# Patient Record
Sex: FEMALE | Race: WHITE | NOT HISPANIC OR LATINO | Employment: FULL TIME | URBAN - METROPOLITAN AREA
[De-identification: names, ages, dates, MRNs, and addresses within clinical notes are randomized per-mention and may not be internally consistent; named-entity substitution may affect disease eponyms.]

---

## 2018-12-04 ENCOUNTER — OFFICE VISIT (OUTPATIENT)
Dept: URGENT CARE | Facility: CLINIC | Age: 28
End: 2018-12-04
Payer: COMMERCIAL

## 2018-12-04 VITALS
SYSTOLIC BLOOD PRESSURE: 137 MMHG | WEIGHT: 142.8 LBS | TEMPERATURE: 100 F | HEIGHT: 60 IN | BODY MASS INDEX: 28.03 KG/M2 | HEART RATE: 88 BPM | OXYGEN SATURATION: 96 % | DIASTOLIC BLOOD PRESSURE: 92 MMHG | RESPIRATION RATE: 16 BRPM

## 2018-12-04 DIAGNOSIS — J02.9 SORE THROAT: Primary | ICD-10-CM

## 2018-12-04 DIAGNOSIS — H65.192 OTHER ACUTE NONSUPPURATIVE OTITIS MEDIA OF LEFT EAR, RECURRENCE NOT SPECIFIED: ICD-10-CM

## 2018-12-04 LAB — S PYO AG THROAT QL: NEGATIVE

## 2018-12-04 PROCEDURE — 99203 OFFICE O/P NEW LOW 30 MIN: CPT | Performed by: NURSE PRACTITIONER

## 2018-12-04 PROCEDURE — 87430 STREP A AG IA: CPT | Performed by: NURSE PRACTITIONER

## 2018-12-04 RX ORDER — NORTRIPTYLINE HYDROCHLORIDE 10 MG/1
CAPSULE ORAL
COMMUNITY

## 2018-12-04 RX ORDER — VENLAFAXINE HYDROCHLORIDE 75 MG/1
75 CAPSULE, EXTENDED RELEASE ORAL DAILY
COMMUNITY

## 2018-12-04 RX ORDER — ATOMOXETINE 60 MG/1
60 CAPSULE ORAL DAILY
COMMUNITY

## 2018-12-04 RX ORDER — AMOXICILLIN AND CLAVULANATE POTASSIUM 875; 125 MG/1; MG/1
1 TABLET, FILM COATED ORAL EVERY 12 HOURS SCHEDULED
Qty: 14 TABLET | Refills: 0 | Status: SHIPPED | OUTPATIENT
Start: 2018-12-04 | End: 2018-12-11

## 2018-12-04 RX ORDER — MONTELUKAST SODIUM 10 MG/1
10 TABLET ORAL
COMMUNITY
End: 2019-11-01 | Stop reason: RX

## 2018-12-05 NOTE — PATIENT INSTRUCTIONS
Otitis Media   WHAT YOU NEED TO KNOW:   Otitis media is an ear infection  DISCHARGE INSTRUCTIONS:   Medicines:  · Ibuprofen or acetaminophen  helps decrease your pain and fever  They are available without a doctor's order  Ask your healthcare provider which medicine is right for you  Ask how much to take and how often to take it  These medicines can cause stomach bleeding if not taken correctly  Ibuprofen can cause kidney damage  Do not take ibuprofen if you have kidney disease, an ulcer, or allergies to aspirin  Acetaminophen can cause liver damage  Do not drink alcohol if you take acetaminophen  · Ear drops  help treat your ear pain  · Antibiotics  help treat a bacterial infection that caused your ear infection  · Take your medicine as directed  Contact your healthcare provider if you think your medicine is not helping or if you have side effects  Tell him or her if you are allergic to any medicine  Keep a list of the medicines, vitamins, and herbs you take  Include the amounts, and when and why you take them  Bring the list or the pill bottles to follow-up visits  Carry your medicine list with you in case of an emergency  Heat or ice:   · Heat  may be used to decrease your pain  Place a warm, moist washcloth on your ear  Apply for 15 to 20 minutes, 3 to 4 times a day    · Ice  helps decrease swelling and pain  Use an ice pack or put crushed ice in a plastic bag  Cover the ice pack with a towel and place it on your ear for 15 to 20 minutes, 3 to 4 times a day for 2 days  Prevent otitis media:   · Wash your hands often  Use soap and water  Wash your hands after you use the bathroom, change a child's diapers, or sneeze  Wash your hands before you prepare or eat food  · Stay away from people who are ill  Some germs are easily and quickly spread through contact  Return to work or school: You may return to work or school when your fever is gone     Follow up with your healthcare provider as directed:  Write down your questions so you remember to ask them during your visits  Contact your healthcare provider if:   · Your ear pain gets worse or does not go away, even after treatment  · The outside of your ear is red or swollen  · You have vomiting or diarrhea  · You have fluid coming from your ear  · You have questions or concerns about your condition or care  Return to the emergency department if:   · You have a seizure  · You have a fever and a stiff neck  © 2017 [facility] Information is for End User's use only and may not be sold, redistributed or otherwise used for commercial purposes  All illustrations and images included in CareNotes® are the copyrighted property of [employer]  or [employer]ul [employer]  The above information is an  only  It is not intended as medical advice for individual conditions or treatments  Talk to your doctor, nurse or pharmacist before following any medical regimen to see if it is safe and effective for you

## 2018-12-05 NOTE — PROGRESS NOTES
Assessment/Plan    Sore throat [J02 9]  1  Sore throat  POCT rapid strepA   2  Other acute nonsuppurative otitis media of left ear, recurrence not specified  amoxicillin-clavulanate (AUGMENTIN) 875-125 mg per tablet     - rapid strep test is negative  - Augmentin 875 milligrams b i d  X7 days for ear infection  - advised to follow up with PCP to ensure complete resolution        Subjective:  Presents to clinic with complaint sore throat, body aches and chills     Patient ID: Darrold Fleischer is a 33 y o  female  Reason For Visit / Chief Complaint  Chief Complaint   Patient presents with    Cold Like Symptoms     pt reports for 2 days she has been having chills, aches, sore throat and an ear ache off/on left ear  no fevers  advil prn         HPI  Symptoms has been on and off for a few days  Has been having ear pain, intermittent, for more than a week  Last experience the ear pain today, lasting about 30 seconds at a time  Said she had chills at home yesterday and today, took some Advil OTC, last dose about 6:30 a m  this morning  Did not check her temperature at home  Temperature at the clinic this evening is 100 degrees  She reports she took azithromycin about 3 weeks ago for sinus infection  Past Medical History:   Diagnosis Date    ADHD (attention deficit hyperactivity disorder)     Anxiety     Depression     Migraine        History reviewed  No pertinent surgical history  Family History   Problem Relation Age of Onset    No Known Problems Mother     No Known Problems Father        Review of Systems   Constitutional: Positive for chills  Negative for fever  HENT: Positive for congestion ( nose), ear pain, rhinorrhea, sinus pressure and sore throat  Negative for ear discharge and hearing loss  Respiratory: Negative for cough, chest tightness and wheezing  Cardiovascular: Negative for chest pain and palpitations  Gastrointestinal: Negative for diarrhea, nausea and vomiting  Neurological: Negative for dizziness  Objective:    /92 (BP Location: Left arm, Patient Position: Sitting)   Pulse 88   Temp 100 °F (37 8 °C) (Tympanic)   Resp 16   Ht 5' (1 524 m)   Wt 64 8 kg (142 lb 12 8 oz)   SpO2 96%   BMI 27 89 kg/m²     Physical Exam   HENT:   Mouth/Throat: No oropharyngeal exudate  Left ear canal with mild swelling and mild erythema  Tympanic membrane is bulging  Mild serous fluid behind the tympanic membrane  Mild to moderate erythema  Similar presentation to the right ear but with less severity  General throat area has mild erythema but tonsils are not swollen  Oral mucosa within normal limits  Cardiovascular: Normal rate, regular rhythm and normal heart sounds  Pulmonary/Chest: Effort normal and breath sounds normal  No respiratory distress  She has no wheezes  Lymphadenopathy:     She has no cervical adenopathy

## 2019-11-01 ENCOUNTER — OFFICE VISIT (OUTPATIENT)
Dept: URGENT CARE | Facility: CLINIC | Age: 29
End: 2019-11-01
Payer: COMMERCIAL

## 2019-11-01 VITALS
OXYGEN SATURATION: 98 % | DIASTOLIC BLOOD PRESSURE: 90 MMHG | TEMPERATURE: 98 F | WEIGHT: 149 LBS | RESPIRATION RATE: 18 BRPM | HEART RATE: 80 BPM | HEIGHT: 60 IN | SYSTOLIC BLOOD PRESSURE: 132 MMHG | BODY MASS INDEX: 29.25 KG/M2

## 2019-11-01 DIAGNOSIS — J01.40 ACUTE NON-RECURRENT PANSINUSITIS: Primary | ICD-10-CM

## 2019-11-01 DIAGNOSIS — R06.2 WHEEZING: ICD-10-CM

## 2019-11-01 PROCEDURE — G0382 LEV 3 HOSP TYPE B ED VISIT: HCPCS | Performed by: PHYSICIAN ASSISTANT

## 2019-11-01 PROCEDURE — S9083 URGENT CARE CENTER GLOBAL: HCPCS | Performed by: PHYSICIAN ASSISTANT

## 2019-11-01 RX ORDER — ELETRIPTAN HYDROBROMIDE 40 MG/1
40 TABLET, FILM COATED ORAL
COMMUNITY
Start: 2019-08-28 | End: 2020-08-27

## 2019-11-01 RX ORDER — VENLAFAXINE 75 MG/1
TABLET ORAL
COMMUNITY
Start: 2017-08-31

## 2019-11-01 RX ORDER — ALBUTEROL SULFATE 90 UG/1
2 AEROSOL, METERED RESPIRATORY (INHALATION) EVERY 6 HOURS PRN
Qty: 18 G | Refills: 0 | Status: SHIPPED | OUTPATIENT
Start: 2019-11-01

## 2019-11-01 RX ORDER — AMOXICILLIN AND CLAVULANATE POTASSIUM 875; 125 MG/1; MG/1
1 TABLET, FILM COATED ORAL EVERY 12 HOURS SCHEDULED
Qty: 14 TABLET | Refills: 0 | Status: SHIPPED | OUTPATIENT
Start: 2019-11-01 | End: 2019-11-08

## 2019-11-01 RX ORDER — PREDNISONE 20 MG/1
40 TABLET ORAL DAILY
Qty: 10 TABLET | Refills: 0 | Status: SHIPPED | OUTPATIENT
Start: 2019-11-01 | End: 2019-11-06

## 2019-11-01 RX ORDER — VENLAFAXINE HYDROCHLORIDE 37.5 MG/1
CAPSULE, EXTENDED RELEASE ORAL
COMMUNITY
Start: 2019-06-14

## 2019-11-01 NOTE — PATIENT INSTRUCTIONS

## 2019-11-12 NOTE — PROGRESS NOTES
Assessment/Plan    Acute non-recurrent pansinusitis [J01 40]  1  Acute non-recurrent pansinusitis  amoxicillin-clavulanate (AUGMENTIN) 875-125 mg per tablet   2  Wheezing  predniSONE 20 mg tablet    albuterol (VENTOLIN HFA) 90 mcg/act inhaler         Subjective:     Patient ID: Alex Caballero is a 30 y o  female  Reason For Visit / Chief Complaint  Chief Complaint   Patient presents with    Cough     x 1 week; worsens with exertion; has noticed some wheezing; has only taken advil for when throat becomes irritated from coughing;     Cold Like Symptoms     also about a week; mostly nasal sx; took sudafed at 0730          27-year-old female presents the clinic with nasal congestion, rhinorrhea, cough that started approximately a week ago  Patient states that she has also noticed her cough is worse with exertion and has had wheezing  Patient has taken Sudafed and Advil for symptom relief  Past Medical History:   Diagnosis Date    ADHD (attention deficit hyperactivity disorder)     Anxiety     Depression     Migraine        History reviewed  No pertinent surgical history  Family History   Problem Relation Age of Onset    No Known Problems Mother     No Known Problems Father        Review of Systems   Constitutional: Negative for chills and fever  HENT: Positive for congestion, rhinorrhea, sinus pressure and sore throat  Negative for sinus pain  Respiratory: Positive for cough and wheezing  Gastrointestinal: Negative for abdominal pain, diarrhea, nausea and vomiting  Musculoskeletal: Negative for myalgias  Neurological: Positive for headaches  Negative for dizziness and light-headedness         Objective:    /90 (BP Location: Left arm, Patient Position: Sitting) Comment: took sudafed this am  Pulse 80   Temp 98 °F (36 7 °C)   Resp 18   Ht 5' (1 524 m)   Wt 67 6 kg (149 lb)   SpO2 98%   BMI 29 10 kg/m²     Physical Exam   Constitutional: She is oriented to person, place, and time  She appears well-developed and well-nourished  No distress  HENT:   Head: Normocephalic and atraumatic  Right Ear: Tympanic membrane normal    Left Ear: Tympanic membrane normal    Nose: Mucosal edema and rhinorrhea present  Right sinus exhibits maxillary sinus tenderness and frontal sinus tenderness  Left sinus exhibits maxillary sinus tenderness and frontal sinus tenderness  Mouth/Throat: Uvula is midline and mucous membranes are normal  Posterior oropharyngeal erythema (PND) present  Cardiovascular: Normal rate and regular rhythm  Pulmonary/Chest: Effort normal  She has wheezes  Musculoskeletal: Normal range of motion  Neurological: She is alert and oriented to person, place, and time  Skin: Skin is warm and dry  No rash noted  She is not diaphoretic  Nursing note and vitals reviewed

## 2020-02-09 RX ORDER — ATOMOXETINE 60 MG/1
CAPSULE ORAL
Qty: 90 CAPSULE | Refills: 1 | OUTPATIENT
Start: 2020-02-09

## 2020-02-11 RX ORDER — ATOMOXETINE 60 MG/1
CAPSULE ORAL
Qty: 90 CAPSULE | Refills: 1 | OUTPATIENT
Start: 2020-02-11

## 2021-02-08 ENCOUNTER — OFFICE VISIT (OUTPATIENT)
Dept: URGENT CARE | Facility: CLINIC | Age: 31
End: 2021-02-08
Payer: COMMERCIAL

## 2021-02-08 VITALS
HEIGHT: 60 IN | RESPIRATION RATE: 18 BRPM | OXYGEN SATURATION: 97 % | TEMPERATURE: 99 F | HEART RATE: 108 BPM | BODY MASS INDEX: 26.5 KG/M2 | WEIGHT: 135 LBS

## 2021-02-08 DIAGNOSIS — Z11.59 SCREENING FOR VIRAL DISEASE: Primary | ICD-10-CM

## 2021-02-08 PROCEDURE — G0382 LEV 3 HOSP TYPE B ED VISIT: HCPCS | Performed by: PHYSICIAN ASSISTANT

## 2021-02-08 PROCEDURE — U0003 INFECTIOUS AGENT DETECTION BY NUCLEIC ACID (DNA OR RNA); SEVERE ACUTE RESPIRATORY SYNDROME CORONAVIRUS 2 (SARS-COV-2) (CORONAVIRUS DISEASE [COVID-19]), AMPLIFIED PROBE TECHNIQUE, MAKING USE OF HIGH THROUGHPUT TECHNOLOGIES AS DESCRIBED BY CMS-2020-01-R: HCPCS | Performed by: PHYSICIAN ASSISTANT

## 2021-02-08 PROCEDURE — S9083 URGENT CARE CENTER GLOBAL: HCPCS | Performed by: PHYSICIAN ASSISTANT

## 2021-02-08 PROCEDURE — U0005 INFEC AGEN DETEC AMPLI PROBE: HCPCS | Performed by: PHYSICIAN ASSISTANT

## 2021-02-08 NOTE — PROGRESS NOTES
[facility]        NAME: Mai Alfonso is a 35 y o  female  : 1990    MRN: [id number]  DATE: 2021  TIME: 11:17 AM    Assessment and Plan   Screening for viral disease [Z11 59]  1  Screening for viral disease  Novel Coronavirus (Covid-19),PCR [facility] - Office Collection         Patient Instructions     Swabbed for COVID-19, discussed self quarantine until contacted with results  Monitor for worsening symptoms  C/w OTC symptom relief including tylenol, fluids, rest  Recommend supplementing with vitamins D & C as well as a multivitamin    Follow up with PCP in 3-5 days  Proceed to  ER if symptoms worsen  Chief Complaint     Chief Complaint   Patient presents with   Karol Sang Like Symptoms     Sx began Saturday with a H/A (since relieved with Excedrin)  Nasal sx began  night    Earache     Right > left ear pain and some sinus pressure  History of Present Illness       Patient presents for COVID-19 testing  Pt reports symptoms of headache, ear pain, congestion, and sinus pressure  Pt denies fever, chills, sweats, chest tightness, dyspnea, abdominal pain, nausea, and vomiting  Pt denies history of underlying medical problems such as asthma  Pt reports taking OTC symptom relief, excedrin, without significant improvement  Pt denies recent known COVID exposure  Review of Systems   Review of Systems   Constitutional: Negative for chills and fever  HENT: Positive for congestion, ear pain and sinus pressure  Negative for ear discharge and sore throat  Eyes: Negative for pain and visual disturbance  Respiratory: Negative for cough and shortness of breath  Cardiovascular: Negative for chest pain and palpitations  Gastrointestinal: Negative for abdominal pain and vomiting  Genitourinary: Negative for dysuria and hematuria  Musculoskeletal: Negative for arthralgias and back pain  Skin: Negative for color change and rash  Neurological: Positive for headaches  Negative for seizures and syncope  All other systems reviewed and are negative  Current Medications       Current Outpatient Medications:     albuterol (VENTOLIN HFA) 90 mcg/act inhaler, Inhale 2 puffs every 6 (six) hours as needed for wheezing, Disp: 18 g, Rfl: 0    atoMOXetine (STRATTERA) 60 mg capsule, Take 60 mg by mouth daily, Disp: , Rfl:     eletriptan (RELPAX) 40 MG tablet, Take 40 mg by mouth, Disp: , Rfl:     nortriptyline (PAMELOR) 10 mg capsule, Take by mouth daily at bedtime, Disp: , Rfl:     venlafaxine (EFFEXOR) 75 mg tablet, TAKE 1 TABLET BY MOUTH EVERY DAY, Disp: , Rfl:     venlafaxine (EFFEXOR-XR) 37 5 mg 24 hr capsule, TAKE 1 CAPSULE BY MOUTH EVERY DAY, Disp: , Rfl:     venlafaxine (EFFEXOR-XR) 75 mg 24 hr capsule, Take 75 mg by mouth daily, Disp: , Rfl:     Current Allergies     Allergies as of 02/08/2021 - Reviewed 02/08/2021   Allergen Reaction Noted    Gluten meal  12/04/2018    Mold extract [trichophyton]  12/04/2018    Soy [soy isoflavones]  12/04/2018            The following portions of the patient's history were reviewed and updated as appropriate: allergies, current medications, past family history, past medical history, past social history, past surgical history and problem list      Past Medical History:   Diagnosis Date    ADHD (attention deficit hyperactivity disorder)     Anxiety     Depression     Migraine        No past surgical history on file  Family History   Problem Relation Age of Onset    No Known Problems Mother     No Known Problems Father          Medications have been verified  Objective   Pulse (!) 108   Temp 99 °F (37 2 °C)   Resp 18   Ht 5' (1 524 m)   Wt 61 2 kg (135 lb)   SpO2 97%   BMI 26 37 kg/m²   No LMP recorded  Physical Exam     Physical Exam  Vitals signs and nursing note reviewed  Constitutional:       General: She is not in acute distress  Appearance: Normal appearance  She is well-developed   She is not ill-appearing or diaphoretic  HENT:      Head: Normocephalic and atraumatic  Right Ear: Tympanic membrane and ear canal normal       Left Ear: Tympanic membrane and ear canal normal       Nose: Nose normal       Mouth/Throat:      Mouth: Mucous membranes are dry  Eyes:      Conjunctiva/sclera: Conjunctivae normal       Pupils: Pupils are equal, round, and reactive to light  Neck:      Musculoskeletal: Normal range of motion and neck supple  Cardiovascular:      Rate and Rhythm: Normal rate and regular rhythm  Heart sounds: Normal heart sounds  Pulmonary:      Effort: Pulmonary effort is normal  No respiratory distress  Breath sounds: Normal breath sounds  No stridor  No wheezing, rhonchi or rales  Lymphadenopathy:      Cervical: No cervical adenopathy  Skin:     General: Skin is warm and dry  Capillary Refill: Capillary refill takes less than 2 seconds  Findings: No rash  Neurological:      Mental Status: She is alert and oriented to person, place, and time  Cranial Nerves: No cranial nerve deficit  Sensory: No sensory deficit  Psychiatric:         Behavior: Behavior normal          Thought Content:  Thought content normal

## 2021-02-08 NOTE — PATIENT INSTRUCTIONS
Swabbed for COVID-19, discussed self quarantine until contacted with results  Monitor for worsening symptoms  C/w OTC symptom relief including tylenol, fluids, rest  Recommend supplementing with vitamins D & C as well as a multivitamin  COVID-19 (Coronavirus Disease 2019)   AMBULATORY CARE:   Coronavirus disease 2019 (COVID-19)  is the disease caused by the novel (new) coronavirus first discovered in December 2019  Coronaviruses generally cause upper respiratory (nose, throat, and lung) infections, such as a cold  The new virus can also cause serious lower respiratory conditions, such as pneumonia or acute respiratory distress syndrome (ARDS)  Anyone can develop serious problems from the new virus, but your risk is higher if you are 65 or older  A weak immune system, diabetes, or a heart or lung condition can also increase your risk  Signs and symptoms: You may not develop any signs or symptoms  Signs and symptoms that do develop usually start about 5 days after infection but can take 2 to 14 days  Signs and symptoms range from mild to severe  You may feel like you have the flu or a bad cold  Information on COVID-19 is still being learned   Tell your healthcare provider if you think you were infected but develop signs or symptoms not listed below:  · A cough    · Shortness of breath or trouble breathing that may become severe    · A fever of at least 100 4°F, or 38°C (may be lower in adults 65 or older)    · Chills that might include shaking    · Muscle pain, body aches, or a headache    · A sore throat    · Suddenly not being able to taste or smell anything    · Feeling mentally and physically tired (fatigue)    · Congestion (stuffy head and nose), or a runny nose    · Diarrhea, nausea, or vomiting    If you think you or someone you know may be infected:  Do the following to protect others:  · If emergency care is needed,  tell the  about the possible infection, or call ahead and tell the emergency department  · Call a healthcare provider  for instructions if symptoms are mild  Anyone who may be infected should not  arrive without calling first  The provider will need to protect staff members and other patients  · The person who may be infected needs to wear a face covering  while getting medical care  This will help lower the risk of infecting others  Coverings are not used for anyone who is younger than 2 years, has breathing problems, or cannot remove it  The provider can give you instructions for anyone who cannot wear a covering  Call your local emergency number (911 in the [address] [place] [address],[address]) or go to the emergency department if:   · You have trouble breathing or shortness of breath at rest     · You have chest pain or pressure that lasts longer than 5 minutes  · You become confused or hard to wake  · Your lips or face are blue  · You have a fever of 104°F (40°C) or higher  Call your doctor if:   · You do not  have symptoms of COVID-19 but had close physical contact within 14 days with someone who tested positive  · You have questions or concerns about your condition or care  How COVID-19 is diagnosed: If you think you have COVID-19, call your healthcare provider  In some areas, testing is only done if a person has severe symptoms or is hospitalized  Testing is done more widely in other places  Your provider will tell you what to do based on your symptoms and the rules in your area  In general, the following may be used:  · A viral test  shows if you have a current infection  Samples are taken from your nose and throat, usually with swabs  You may need to wait several days to get the test results  Your healthcare provider will tell you how to get your results  You will need to quarantine (stay physically away from others) until you get your results  If results show you have COVID-19, you will need to quarantine until you are well   Your provider or other health official may give you more directions  You will also need to prevent another infection until it is known if you can get COVID-19 again  · An antibody test  shows if you had a past infection  Blood samples are used for this test  Antibodies are made by your immune system to attack the virus that causes COVID-19  Antibodies will form 1 to 3 weeks after you are infected  It is not known if antibodies prevent a second infection, or for how long a person might be protected  If you have antibodies, you will still need to be careful around others until more is known  · CT scans or x-rays  may be used to check for signs of pneumonia  The 2019 coronavirus causes a specific kind of pneumonia, usually in both lungs  Treatment:  No medicine or specific treatment is currently approved for COVID-19  The following may be used to manage your symptoms or treat the effects of COVID-19:  · Mild symptoms  may get better on their own  If you do not need to be treated in a hospital, you will be given instructions to use at home  Your condition will be closely monitored  You will need to watch for worsening symptoms and seek immediate care if needed  Talk to your healthcare provider about the following:    ? Relieve your symptoms  To soothe a sore throat, gargle with warm salt water, or use throat lozenges or a throat spray  Your healthcare provider may recommend a cough medicine  Drink more liquids to thin and loosen mucus and to prevent dehydration  Use decongestants or saline drops as directed for nasal congestion  ? NSAIDs or acetaminophen  can help lower a fever and relieve body aches or a headache  Follow directions  If not taken correctly, NSAIDs can cause kidney damage and acetaminophen can cause liver damage  · Severe or life-threatening symptoms  are treated in the hospital  You may need a combination of the following:    ? Medicines  may be given to reduce inflammation or to fight the virus   You may also need blood thinners to prevent or treat blood clots  If you have a deep vein thrombosis (DVT) or pulmonary embolism (PE), you may need to keep using blood thinners for 3 months  ? Extra oxygen  may be given if you have respiratory failure  This means your lungs cannot get enough oxygen into your blood and out to your organs  Extra oxygen can help prevent organ failure  ? A ventilator  may be used to help you breathe  ? Convalescent plasma (part of blood)  from a patient who has recovered from COVID-19 may be used  The plasma contains antibodies that can help your body fight the infection  Convalescent plasma is only given to patients who have severe signs and symptoms  How the 2019 coronavirus spreads: The virus spreads quickly and easily  You can become infected if you are in contact with a large amount of the virus, even for a short time  You can also become infected by being around a small amount of virus for a long time  The following are ways the virus is thought to spread, but more information may be coming:  · Droplets are the most common way all coronaviruses spread  The virus can travel in droplets that form when a person talks, coughs, or sneezes  Anyone who breathes in the droplets or gets them in his or her eyes can become infected with the virus  Close personal contact with an infected person is thought to be the main way the virus spreads  Close personal contact means you are within 6 feet (2 meters) of the person  · Person-to-person contact can spread the virus  For example, a person with the virus on his or her hands can spread it by shaking hands with someone  At this time, it does not appear that the virus can be passed to a baby during pregnancy or delivery  The baby can be infected after he or she is born through person-to-person contact  The virus also does not appear to spread in breast milk   If you are pregnant or breastfeeding, talk to your healthcare provider or obstetrician about any concerns you have     · The virus can stay on objects and surfaces  A person can get the virus on his or her hands by touching the object or surface  Infection happens if the person then touches his or her eyes or mouth with unwashed hands  It is not yet known how long the virus can stay on an object or surface  That is why it is important to clean all surfaces that are used regularly  · An infected animal may be able to infect a person who touches it  This may happen at live markets or on a farm  How everyone can lower the risk for COVID-19:  The best way to prevent infection is to avoid anyone who is infected, but this can be hard to do  An infected person can spread the virus before signs or symptoms begin, or even if signs or symptoms never develop  The following can help lower the risk for infection:      · Wash your hands often throughout the day  Use soap and water  Rub your soapy hands together, lacing your fingers  Wash the front and back of each hand, and in between your fingers  Use the fingers of one hand to scrub under the fingernails of the other hand  Wash for at least 20 seconds  Rinse with warm, running water for several seconds  Then dry your hands with a clean towel or paper towel  Use hand  that contains alcohol if soap and water are not available  Do not touch your eyes, nose, or mouth without washing your hands first  Teach children how to wash their hands and use hand   · Cover a sneeze or cough  This prevents droplets from traveling from you to others  Turn your face away and cover your mouth and nose with a tissue  Throw the tissue away  Use the bend of your arm if a tissue is not available  Then wash your hands well with soap and water or use hand   Turn and cover your face if you are around someone who is sneezing or coughing  Teach children how to cover a cough or sneeze  · Follow worldwide, national, and local social distancing guidelines    Social distancing means people avoid close physical contact so the virus cannot spread from one person to another  Keep at least 6 feet (2 meters) between you and others  Also keep this distance from anyone who comes to your home, such as someone making a delivery  · Make a habit of not touching your face  It is not known how long the virus can stay on objects and surfaces  If you get the virus on your hands, you can transfer it to your eyes, nose, or mouth and become infected  You can also transfer it to objects, surfaces, or people  Be aware of what you touch when you go out  Examples include handrails and elevator buttons  Try not to touch anything with bare hands unless it is necessary  Wash your hands before you leave your home and when you return  · Clean and disinfect high-touch surfaces and objects often  Use a disinfecting solution or wipes  You can make a solution by diluting 4 teaspoons of bleach in 1 quart (4 cups) of water  Clean and disinfect even if you think no one living in or coming to your home is infected with the virus  You can wipe items with a disinfecting cloth before you bring them into your home  Wash your hands after you handle anything you bring into your home  · Make your immune system as healthy as possible  A weakened immune system makes you more vulnerable to the new coronavirus  No COVID-19 vaccine is available yet  Vaccines such as the flu and pneumonia vaccines can help your immune system  Your healthcare provider can tell you which vaccines to get, and when to get them  Keep your immune system as strong as possible  Do not smoke  Eat healthy foods, exercise regularly, and try to manage stress  Go to bed and wake up at the same times each day  Social distancing guidelines:  National and local social distancing rules vary  Rules may change over time as restrictions are lifted  Restrictions may return if an outbreak happens where you live   It is important to know and follow all current social distancing rules in your area  The following are general guidelines:  · Limit trips out of your home  You may be able to have food, medicines, and other supplies delivered  If possible, have delivered items left at your door or other area  Try not to have someone hand you an item  You will be so close to the person that the virus can spread between you  · Do not have close physical contact with anyone who does not live in your home  Do not shake hands with, hug, or kiss a person as a greeting  Stand or walk as far from others as possible  If you must use public transportation (such as a bus or subway), try to sit or stand away from others  You can stay safely connected with others through phone calls, e-mail messages, social media websites, and video chats  Check in on anyone who may be having a hard time socially distancing, or who lives alone  Ask administrators at nursing homes or long-term care facilities how you can safely communicate with someone living there  · Wear a cloth face covering around others who do not live in your home  Face coverings help prevent the virus from spreading to others in droplets  You can use a clear face covering if someone needs to read your lips  This is a cloth covering that has plastic over the mouth area so your lips can be seen  Do not use coverings that have breathing valves or vents  The virus can travel out of the valve or vent and be spread to others  Do not take your covering off to talk, cough, or sneeze  Do not use coverings on children younger than 2 years or on anyone who has breathing problems or cannot remove it  · Only allow medical or other necessary professionals into your home  Wear your face covering, and remind professionals to wear a face covering  Remind them to wash their hands when they arrive and before they leave  Do not  let anyone who does not live in your home in, even if the person is not sick   A person can pass the virus to others before symptoms of COVID-19 begin  Some people never even develop symptoms  Children commonly have mild symptoms or no symptoms  It may be hard to tell a child not to hug or kiss you  Explain that this is how he or she can help you stay healthy  · Do not go to someone else's home unless it is necessary  Do not go over to visit, even if the person is lonely  Only go if you need to help him or her  Make sure you both wear face coverings while you are there  · Avoid large gatherings and crowds  Gatherings or crowds of 10 or more individuals can cause the virus to spread  Examples of gatherings include parties, sporting events, Sikh services, and conferences  Crowds may form at beaches, [place], and tourist attractions  Protect yourself by staying away from large gatherings and crowds  · Ask your healthcare provider for other ways to have appointments  You may be able to have appointments without having to go into the provider's office  Some providers offer phone, video, or other types of appointments  You may also be able to get prescriptions for a few months of your medicines at a time  · Stay safe if you must go out to work  You may have a job that can only be done outside your home  Keep physical distance between you and other workers as much as possible  Follow your employer's rules so everyone stays safe  If you have COVID-19 and are recovering at home:  Healthcare providers will give you specific instructions to follow  The following are general guidelines to remind you how to keep others safe until you are well:  · Wash your hands often  Use soap and water as much as possible  You can use hand  that contains alcohol if soap and water are not available  Do not share towels with anyone  If you use paper towels, throw them away in a lined trash can kept in your room or area  Use a covered trash can, if possible  · Do not go out of your home unless it is necessary  You may have to go to your healthcare provider's office for check-ups or to get prescription refills  Do not arrive at the provider's office without an appointment  Providers have to make their offices safe for staff and other patients  · Do not have close physical contact with anyone unless it is necessary  Only have close physical contact with a person giving direct care, or a baby or child you must care for  Family members and friends should not visit you  If possible, stay in a separate area or room of your home if you live with others  No one should go into the area or room except to give you care  You can visit with others by phone, video chat, e-mail, or similar systems  It is important to stay connected with others in your life while you recover  · Wear a face covering while others are near you  This can help prevent droplets from spreading the virus when you talk, sneeze, or cough  Put the covering on before anyone comes into your room or area  Remind the person to cover his or her nose and mouth before going in to provide care for you  · Do not share items  Do not share dishes, towels, or other items with anyone  Items need to be washed after you use them  · Protect your baby  Wash your hands with soap and water often throughout the day  Wear a clean face covering while you breastfeed, or while you express or pump breast milk  If possible, ask someone who is well to care for your baby  You can put breast milk in bottles for the person to use, if needed  Talk to your healthcare provider if you have any questions or concerns about caring for or bonding with your baby  He or she will tell you when to bring your baby in for check-ups and vaccines  He or she will also tell you what to do if you think your baby was infected with the new virus  · Do not handle live animals  Until more is known, it is best not to touch, play with, or handle live animals   Some animals, including pets, have been infected with the new coronavirus  Do not handle or care for animals until you are well  Care includes feeding, petting, and cuddling your pet  Do not let your pet lick you or share your food  Ask someone who is not infected to take care of your pet, if possible  If you must care for a pet, wear a face covering  Wash your hands before and after you give care  · Follow directions from your healthcare provider for being around others after you recover  You will need to wait at least 10 days after symptoms first appeared  Then you will need to have no fever for 24 hours without fever medicine, and no other symptoms  A loss of taste or smell may continue for several months  It is considered okay to be around others if this is your only symptom  It is not known for sure if or for how long a recovered person can pass the virus to others  Your provider may give you instructions, such as continuing social distancing or wearing a face covering around others  How to take care of someone who has COVID-19:  If the person lives in another home, arrange for a time to give care  Remember to bring a few pairs of disposable gloves and a cloth face covering  The following are general guidelines to help you safely care for anyone who has COVID-19:  · Wash your hands often  Wash before and after you go into the person's home, area, or room  Throw paper towels away in a lined trash can that has a lid, if possible  · Do not allow others to go near the person  No one should come into the person's home unless it is necessary  If possible, the person should be in a separate area or room if he or she lives with others  Keep the room's door shut unless you need to go in or out  Have others call, video chat, or e-mail the person if he or she is feeling well enough  The person may feel lonely if he or she is kept separate for a long period of time   Safe communication can help him or her stay connected to family and friends  · Make sure the person's room has good air flow  You may be able to open the window if the weather allows  An air conditioner can also be turned on to help air move  · Contact the person before you go in to give care  Make sure the person is wearing a face covering  Remind him or her to wash his or her hands with soap and water  He or she can use hand  that contains alcohol if soap and water are not available  Put on a face covering before you go in to give care  · Wear gloves while you give care and clean  Clean items the person uses often  Clean countertops, cooking surfaces, and the fronts and insides of the microwave and refrigerator  Clean the shower, toilet, the area around the toilet, the sink, the area around the sink, and faucets  Gather used laundry or bedding  Wash and dry items on the warmest settings the fabric allows  Wash dishes and silverware in hot, soapy water or in a   · Anything you throw away needs to go into a lined trash can  When you need to empty the trash, close the open end of the lining and tie it closed  This helps prevent items the virus is on from spilling out of the trash  Remove your gloves and throw them away  Wash your hands  Follow up with your doctor as directed:  Write down your questions so you remember to ask them during your visits  For more information:   · Centers for Disease Control and Prevention  [address] , [address]  Phone: 9- 655 - [phone]  Web Address: [employer] com     © Copyright [facility] [address] Information is for End User's use only and may not be sold, redistributed or otherwise used for commercial purposes  All illustrations and images included in CareNotes® are the copyrighted property of A D A M [employer] Inc  or [facility] [employer]   The above information is an  only  It is not intended as medical advice for individual conditions or treatments   Talk to your doctor, nurse or pharmacist before following any medical regimen to see if it is safe and effective for you

## 2021-02-09 LAB — SARS-COV-2 RNA RESP QL NAA+PROBE: NEGATIVE

## 2021-03-10 DIAGNOSIS — Z23 ENCOUNTER FOR IMMUNIZATION: ICD-10-CM

## 2022-03-05 ENCOUNTER — OFFICE VISIT (OUTPATIENT)
Dept: URGENT CARE | Facility: CLINIC | Age: 32
End: 2022-03-05
Payer: COMMERCIAL

## 2022-03-05 VITALS
HEART RATE: 101 BPM | OXYGEN SATURATION: 99 % | RESPIRATION RATE: 17 BRPM | WEIGHT: 145 LBS | BODY MASS INDEX: 28.47 KG/M2 | TEMPERATURE: 96.8 F | HEIGHT: 60 IN

## 2022-03-05 DIAGNOSIS — J06.9 UPPER RESPIRATORY TRACT INFECTION, UNSPECIFIED TYPE: Primary | ICD-10-CM

## 2022-03-05 PROCEDURE — G0382 LEV 3 HOSP TYPE B ED VISIT: HCPCS | Performed by: PREVENTIVE MEDICINE

## 2022-03-05 PROCEDURE — S9083 URGENT CARE CENTER GLOBAL: HCPCS | Performed by: PREVENTIVE MEDICINE

## 2022-03-05 NOTE — PROGRESS NOTES
[facility]        NAME: John Valentin is a 28 y o  female  : 1990    MRN: [id number]  DATE: 2022  TIME: 2:42 PM    Assessment and Plan   Upper respiratory tract infection, unspecified type [J06 9]  1  Upper respiratory tract infection, unspecified type           Patient Instructions       Follow up with PCP in 3-5 days  Proceed to  ER if symptoms worsen  Chief Complaint     Chief Complaint   Patient presents with    Facial Pain     Pateint reports thought was having allergies at first   Now sinus pain, facial pain, ear pain, runny nose         History of Present Illness       Head congestion x1 week  Now a sensation of ear fullness and mild facial pain  Review of Systems   Review of Systems   HENT: Positive for congestion, ear pain and sinus pressure  Respiratory: Negative for cough and shortness of breath            Current Medications       Current Outpatient Medications:     albuterol (VENTOLIN HFA) 90 mcg/act inhaler, Inhale 2 puffs every 6 (six) hours as needed for wheezing, Disp: 18 g, Rfl: 0    atoMOXetine (STRATTERA) 60 mg capsule, Take 60 mg by mouth daily, Disp: , Rfl:     nortriptyline (PAMELOR) 10 mg capsule, Take by mouth daily at bedtime, Disp: , Rfl:     venlafaxine (EFFEXOR) 75 mg tablet, TAKE 1 TABLET BY MOUTH EVERY DAY, Disp: , Rfl:     venlafaxine (EFFEXOR-XR) 75 mg 24 hr capsule, Take 75 mg by mouth daily, Disp: , Rfl:     eletriptan (RELPAX) 40 MG tablet, Take 40 mg by mouth, Disp: , Rfl:     venlafaxine (EFFEXOR-XR) 37 5 mg 24 hr capsule, TAKE 1 CAPSULE BY MOUTH EVERY DAY, Disp: , Rfl:     Current Allergies     Allergies as of 2022 - Reviewed 2022   Allergen Reaction Noted    Gluten meal - food allergy  2018    Mold extract [trichophyton]  2018    Soy [soy isoflavones]  2018            The following portions of the patient's history were reviewed and updated as appropriate: allergies, current medications, past family history, past medical history, past social history, past surgical history and problem list      Past Medical History:   Diagnosis Date    ADHD (attention deficit hyperactivity disorder)     Anxiety     Depression     Migraine        History reviewed  No pertinent surgical history  Family History   Problem Relation Age of Onset    No Known Problems Mother     No Known Problems Father          Medications have been verified  Objective   Pulse 101   Temp (!) 96 8 °F (36 °C) (Tympanic)   Resp 17   Ht 5' (1 524 m)   Wt 65 8 kg (145 lb)   SpO2 99%   BMI 28 32 kg/m²   No LMP recorded  Physical Exam     Physical Exam  Constitutional:       Appearance: Normal appearance  HENT:      Head:      Comments: No pain on tapping the frontal or maxillary sinuses     Right Ear: Tympanic membrane normal       Left Ear: Tympanic membrane normal       Mouth/Throat:      Mouth: Mucous membranes are moist       Pharynx: Oropharynx is clear  Neurological:      Mental Status: She is alert

## 2022-12-16 ENCOUNTER — OFFICE VISIT (OUTPATIENT)
Dept: URGENT CARE | Facility: CLINIC | Age: 32
End: 2022-12-16

## 2022-12-16 VITALS
SYSTOLIC BLOOD PRESSURE: 165 MMHG | DIASTOLIC BLOOD PRESSURE: 90 MMHG | BODY MASS INDEX: 31.41 KG/M2 | WEIGHT: 160 LBS | HEART RATE: 93 BPM | TEMPERATURE: 98.2 F | OXYGEN SATURATION: 99 % | HEIGHT: 60 IN | RESPIRATION RATE: 17 BRPM

## 2022-12-16 DIAGNOSIS — G43.911 INTRACTABLE MIGRAINE WITH STATUS MIGRAINOSUS, UNSPECIFIED MIGRAINE TYPE: Primary | ICD-10-CM

## 2022-12-16 RX ORDER — DIPHENHYDRAMINE HCL 25 MG
25 TABLET ORAL ONCE
Status: COMPLETED | OUTPATIENT
Start: 2022-12-16 | End: 2022-12-16

## 2022-12-16 RX ORDER — ONDANSETRON 4 MG/1
TABLET, ORALLY DISINTEGRATING ORAL
COMMUNITY
Start: 2022-11-30

## 2022-12-16 RX ORDER — ALPRAZOLAM 0.25 MG/1
0.25 TABLET ORAL 2 TIMES DAILY PRN
COMMUNITY
Start: 2022-10-18

## 2022-12-16 RX ORDER — RIMEGEPANT SULFATE 75 MG/75MG
75 TABLET, ORALLY DISINTEGRATING ORAL
COMMUNITY
Start: 2022-08-17

## 2022-12-16 RX ORDER — KETOROLAC TROMETHAMINE 30 MG/ML
30 INJECTION, SOLUTION INTRAMUSCULAR; INTRAVENOUS ONCE
Status: COMPLETED | OUTPATIENT
Start: 2022-12-16 | End: 2022-12-16

## 2022-12-16 RX ORDER — DIHYDROERGOTAMINE MESYLATE 4 MG/ML
SPRAY NASAL
COMMUNITY
Start: 2022-11-02

## 2022-12-16 RX ORDER — PREDNISONE 20 MG/1
TABLET ORAL
COMMUNITY
Start: 2022-11-20

## 2022-12-16 RX ADMIN — Medication 25 MG: at 19:58

## 2022-12-16 RX ADMIN — KETOROLAC TROMETHAMINE 30 MG: 30 INJECTION, SOLUTION INTRAMUSCULAR; INTRAVENOUS at 19:58

## 2022-12-17 NOTE — PATIENT INSTRUCTIONS
Migraine Headache   AMBULATORY CARE:   A migraine headache  is a severe headache  The pain can be so severe that it interferes with your daily activities  A migraine can last a few hours up to several days  The exact cause of migraines is not known  A family history of migraines increases your risk  Your risk is also higher if you are a woman or take medicines such as estrogen or a vasodilator  Common warning signs include the following:  Warning signs usually start 15 to 60 minutes before the headache:  Visual changes (auras), such as blurred vision, temporary blind or bright spots, lines, or hallucinations    Unusual tiredness or frequent yawning    Tingling in an arm or leg    Signs and symptoms of a migraine headache:  A migraine headache usually begins as a dull ache around the eye or temple  The pain may get worse with movement  You may also have the following:  Pain in your head that may increase to the point that you cannot do everyday activities    Pain on one or both sides of your head    Throbbing, pulsing, or pounding pain in your head    Nausea and vomiting    Sensitivity to light, noise, or smells    Call your local emergency number (911 in the [address] [place] [address],[address]) or have someone call if:   You feel like you are going to faint, you become confused, or you have a seizure  Seek care immediately if:   You have a headache that seems different or much worse than your usual migraine headache  You have a severe headache with a fever or a stiff neck  You have new problems with speech, vision, balance, or movement  Call your doctor or neurologist if:   You have a fever  Your migraines interfere with your daily activities  Your medicines or treatments stop working  You have questions about your condition or care  Treatment:  Migraines cannot be cured  The goal of treatment is to reduce your symptoms  Take medicine as soon as you feel a migraine begin, or as directed   The following may be used to manage migraines:  Medicines  may be given to prevent or treat migraines  Take medicine to prevent migraines as soon as you feel a migraine begin, or as directed  Your healthcare provider may recommend any of the following:    Migraine medicines  are used to help prevent or stop a migraine  NSAIDs  help decrease swelling and pain or fever  This medicine is available with or without a doctor's order  NSAIDs can cause stomach bleeding or kidney problems in certain people  If you take blood thinner medicine, always ask your healthcare provider if NSAIDs are safe for you  Always read the medicine label and follow directions  Acetaminophen  decreases pain and fever  It is available without a doctor's order  Ask how much to take and how often to take it  Follow directions  Read the labels of all other medicines you are using to see if they also contain acetaminophen, or ask your doctor or pharmacist  Acetaminophen can cause liver damage if not taken correctly  Do not use more than 4 grams (4,000 milligrams) total of acetaminophen in one day  Prescription pain medicine  may be given  Ask your healthcare provider how to take this medicine safely  Some prescription pain medicines contain acetaminophen  Do not take other medicines that contain acetaminophen without talking to your healthcare provider  Too much acetaminophen may cause liver damage  Prescription pain medicine may cause constipation  Ask your healthcare provider how to prevent or treat constipation  Antinausea medicine  may be given to calm your stomach and to help prevent vomiting  This medicine can also help relieve pain  Cognitive behavior therapy (CBT)  can help you learn ways to manage and prevent migraines  A therapist can teach you to relax and to reduce stress  You may learn ways to create healthy nutrition, activity, and sleep habits to prevent migraines   The therapist can also help you manage conditions that can affect migraines, such as anxiety or depression  Common triggers for a migraine include the following:   Stress, eye strain, oversleeping, or not getting enough sleep    Hormone changes in women from birth control pills, pregnancy, menopause, or during a monthly period    Skipping meals, going too long without eating, or not drinking enough liquids    Certain foods or drinks such as chocolate, hard cheese, alcohol, or drinks that contain caffeine    Foods that contain gluten, nitrates, MSG, or artificial sweeteners    Sunlight, bright or flashing lights, loud noises, smoke, or strong smells    Heat, humidity, or changes in the weather    Manage your symptoms:   Rest in a dark, quiet room  This will help decrease your pain  Sleep may also help relieve the pain  Apply ice to decrease pain  Use an ice pack, or put crushed ice in a plastic bag  Cover the ice pack with a towel and place it on your head where it hurts for 15 to 20 minutes every hour  Apply heat to decrease pain and muscle spasms  Use a small towel dampened with warm water or a heating pad, or sit in a warm bath  Apply heat on the area for 20 to 30 minutes every 2 hours  You may alternate heat and ice  Keep a migraine record  Write down when your migraines start and stop  Include your symptoms and what you were doing when a migraine began  Record what you ate or drank for 24 hours before the migraine started  Keep track of what you did to treat your migraine and if it worked  Prevent another migraine headache:   Prevent a medicine overuse headache  Take pain medicines only as long as directed  A medicine may be limited to a certain amount each month  Your healthcare provider can help you create a plan so you get a safe amount each month  Do not smoke  Nicotine and other chemicals in cigarettes and cigars can trigger a migraine and also cause lung damage  Ask your healthcare provider for information if you currently smoke and need help to quit  E-cigarettes or smokeless tobacco still contain nicotine  Talk to your healthcare provider before you use these products  Do not drink alcohol  Alcohol can trigger a migraine  It can also interfere with the medicines used to treat your migraine  Be physically active  Physical activity, such as exercise, may help prevent migraines  Talk to your healthcare provider about the best activity plan for you  Try to get at least 30 minutes of physical activity on most days  Manage stress  Stress may trigger a migraine  Learn new ways to relax, such as deep breathing  Follow a sleep schedule  Go to bed and get up at the same time each day  Eat a variety of healthy foods  Healthy foods include fruit, vegetables, whole-grain breads, low-fat dairy products, beans, lean meats, and fish  Do not have foods or drinks that trigger your migraines  Drink more liquids to prevent dehydration  Your healthcare provider can tell you how much liquid to drink each day and which liquids are best for you  Follow up with your doctor or neurologist as directed:  Bring your migraine record with you  Write down your questions so you remember to ask them during your visits  © Copyright [employer] 2022 Information is for End User's use only and may not be sold, redistributed or otherwise used for commercial purposes  All illustrations and images included in CareNotes® are the copyrighted property of [employer]  or [facility]   The above information is an  only  It is not intended as medical advice for individual conditions or treatments  Talk to your doctor, nurse or pharmacist before following any medical regimen to see if it is safe and effective for you

## 2023-02-03 NOTE — PROGRESS NOTES
[facility]        NAME: Chaitanya Chahal is a 33 y o  female  : 1990    MRN: [id number]  DATE: February 3, 2023  TIME: 7:48 AM    Assessment and Plan   Intractable migraine with status migrainosus, unspecified migraine type [G43 911]  1  Intractable migraine with status migrainosus, unspecified migraine type  ketorolac (TORADOL) injection 30 mg    diphenhydrAMINE (BENADRYL) tablet 25 mg            Patient Instructions       Follow up with PCP in 3-5 days  Proceed to  ER if symptoms worsen  Chief Complaint     Chief Complaint   Patient presents with   • Migraine     Pt presents with Migraine that started this morning  Pt states headache, light sensitivity  History of Present Illness       presents with Migraine that started this morning  Pt states headache, light sensitivity  Hx of migraines  Review of Systems   Review of Systems   Constitutional: Negative for chills and fatigue  Eyes: Positive for photophobia  Negative for discharge, redness, itching and visual disturbance  Neurological: Positive for headaches  Current Medications       Current Outpatient Medications:   •  albuterol (VENTOLIN HFA) 90 mcg/act inhaler, Inhale 2 puffs every 6 (six) hours as needed for wheezing, Disp: 18 g, Rfl: 0  •  ALPRAZolam (XANAX) 0 25 mg tablet, Take 0 25 mg by mouth 2 (two) times a day as needed, Disp: , Rfl:   •  atoMOXetine (STRATTERA) 60 mg capsule, Take 60 mg by mouth daily, Disp: , Rfl:   •  eletriptan (RELPAX) 40 MG tablet, Take 40 mg by mouth, Disp: , Rfl:   •  ondansetron (ZOFRAN-ODT) 4 mg disintegrating tablet, TAKE 1 TABLET (4 MG TOTAL) BY MOUTH ONE TIME AS NEEDED FOR NAUSEA OR VOMITING , Disp: , Rfl:   •  predniSONE 20 mg tablet, TAKE 3 TABLETS (60 MG TOTAL) BY MOUTH DAILY   TAKE UP TO THREE DAYS IN A ROW IF NEEDED FOR MIGRAINE, Disp: , Rfl:   •  Rimegepant Sulfate (Nurtec) 75 MG TBDP, Take 75 mg by mouth, Disp: , Rfl:   •  venlafaxine (EFFEXOR-XR) 37 5 mg 24 hr capsule, TAKE 1 CAPSULE BY MOUTH EVERY DAY, Disp: , Rfl:   •  venlafaxine (EFFEXOR-XR) 75 mg 24 hr capsule, Take 75 mg by mouth daily, Disp: , Rfl:   •  dihydroergotamine (MIGRANAL) 4 MG/ML nasal spray, USE 1 SPRAY INTO EACH NOSTRIL AS NEEDED FOR MIGRAINE  NO MORE THAN 4 SPRAYS IN ONE HOUR , Disp: , Rfl:   •  nortriptyline (PAMELOR) 10 mg capsule, Take by mouth daily at bedtime (Patient not taking: Reported on 12/16/2022), Disp: , Rfl:   •  venlafaxine (EFFEXOR) 75 mg tablet, TAKE 1 TABLET BY MOUTH EVERY DAY (Patient not taking: Reported on 12/16/2022), Disp: , Rfl:     Current Allergies     Allergies as of 12/16/2022 - Reviewed 12/16/2022   Allergen Reaction Noted   • Cat hair extract Nasal Congestion 08/25/2015   • Gluten meal - food allergy  12/04/2018   • Mold extract [trichophyton]  12/04/2018   • Soy allergy - food allergy GI Intolerance 12/27/2016   • Soy [isoflavones (soy)]  12/04/2018            The following portions of the patient's history were reviewed and updated as appropriate: allergies, current medications, past family history, past medical history, past social history, past surgical history and problem list      Past Medical History:   Diagnosis Date   • ADHD (attention deficit hyperactivity disorder)    • Anxiety    • Depression    • Migraine        History reviewed  No pertinent surgical history  Family History   Problem Relation Age of Onset   • No Known Problems Mother    • No Known Problems Father          Medications have been verified  Objective   /90   Pulse 93   Temp 98 2 °F (36 8 °C)   Resp 17   Ht 5' (1 524 m)   Wt 72 6 kg (160 lb)   SpO2 99%   BMI 31 25 kg/m²   No LMP recorded  Physical Exam     Physical Exam  Vitals and nursing note reviewed  Constitutional:       General: She is not in acute distress  Appearance: She is well-developed  She is not diaphoretic  HENT:      Head: Normocephalic and atraumatic        Right Ear: Tympanic membrane normal  Left Ear: Tympanic membrane normal       Nose: Nose normal       Mouth/Throat:      Pharynx: Uvula midline  Eyes:      General: Lids are normal       Conjunctiva/sclera: Conjunctivae normal       Pupils: Pupils are equal, round, and reactive to light  Slit lamp exam:     Right eye: Photophobia present  Left eye: Photophobia present  Pulmonary:      Effort: Pulmonary effort is normal    Musculoskeletal:         General: Normal range of motion  Skin:     General: Skin is warm and dry  Findings: No rash  Neurological:      Mental Status: She is alert and oriented to person, place, and time  Cranial Nerves: No cranial nerve deficit  Sensory: No sensory deficit  Motor: No weakness        Coordination: Coordination normal       Gait: Gait normal

## 2023-10-18 ENCOUNTER — OFFICE VISIT (OUTPATIENT)
Dept: URGENT CARE | Facility: CLINIC | Age: 33
End: 2023-10-18
Payer: MEDICARE

## 2023-10-18 VITALS
SYSTOLIC BLOOD PRESSURE: 180 MMHG | HEART RATE: 84 BPM | TEMPERATURE: 97.8 F | OXYGEN SATURATION: 99 % | DIASTOLIC BLOOD PRESSURE: 120 MMHG | RESPIRATION RATE: 18 BRPM

## 2023-10-18 DIAGNOSIS — I10 HYPERTENSION, UNSPECIFIED TYPE: ICD-10-CM

## 2023-10-18 DIAGNOSIS — J02.9 SORE THROAT: Primary | ICD-10-CM

## 2023-10-18 PROCEDURE — 99213 OFFICE O/P EST LOW 20 MIN: CPT | Performed by: PHYSICIAN ASSISTANT

## 2023-10-18 NOTE — PROGRESS NOTES
[facility]        NAME: Mazin Schulte is a 29 y.o. female  : 1990    MRN: [id number]  DATE: 2023  TIME: 7:33 PM    Assessment and Plan   Sore throat [J02.9]  1. Sore throat  Transfer to other facility      2. Hypertension, unspecified type  Transfer to other facility            Patient Instructions       Follow up with PCP in 3-5 days. Proceed to  ER if symptoms worsen. Chief Complaint     Chief Complaint   Patient presents with    Sore Throat     Patient states she has had a sore throat the last couple of days, yesterday coughed up blood          History of Present Illness       Patient present with sore throat, coughing up a "Tablespoon" of blood for two days and high blood pressure. Patient reports high blood pressure she wast trying to treat without medications. Denies any current chest pain. Denies any recent illnesses. Patient reports she does not drink any alcohol. Allergies were reviewed in chart. Sore Throat   Associated symptoms include coughing. Review of Systems   Review of Systems   Constitutional: Negative. HENT:  Positive for sore throat. Respiratory:  Positive for cough. Cardiovascular: Negative. Psychiatric/Behavioral: Negative. Current Medications       Current Outpatient Medications:     albuterol (VENTOLIN HFA) 90 mcg/act inhaler, Inhale 2 puffs every 6 (six) hours as needed for wheezing, Disp: 18 g, Rfl: 0    ALPRAZolam (XANAX) 0.25 mg tablet, Take 0.25 mg by mouth 2 (two) times a day as needed, Disp: , Rfl:     atoMOXetine (STRATTERA) 60 mg capsule, Take 60 mg by mouth daily, Disp: , Rfl:     dihydroergotamine (MIGRANAL) 4 MG/ML nasal spray, USE 1 SPRAY INTO EACH NOSTRIL AS NEEDED FOR MIGRAINE.  NO MORE THAN 4 SPRAYS IN ONE HOUR., Disp: , Rfl:     eletriptan (RELPAX) 40 MG tablet, Take 40 mg by mouth, Disp: , Rfl:     nortriptyline (PAMELOR) 10 mg capsule, Take by mouth daily at bedtime (Patient not taking: Reported on 12/16/2022), Disp: , Rfl:     ondansetron (ZOFRAN-ODT) 4 mg disintegrating tablet, TAKE 1 TABLET (4 MG TOTAL) BY MOUTH ONE TIME AS NEEDED FOR NAUSEA OR VOMITING., Disp: , Rfl:     predniSONE 20 mg tablet, TAKE 3 TABLETS (60 MG TOTAL) BY MOUTH DAILY. TAKE UP TO THREE DAYS IN A ROW IF NEEDED FOR MIGRAINE, Disp: , Rfl:     Rimegepant Sulfate (Nurtec) 75 MG TBDP, Take 75 mg by mouth, Disp: , Rfl:     venlafaxine (EFFEXOR) 75 mg tablet, TAKE 1 TABLET BY MOUTH EVERY DAY (Patient not taking: Reported on 12/16/2022), Disp: , Rfl:     venlafaxine (EFFEXOR-XR) 37.5 mg 24 hr capsule, TAKE 1 CAPSULE BY MOUTH EVERY DAY, Disp: , Rfl:     venlafaxine (EFFEXOR-XR) 75 mg 24 hr capsule, Take 75 mg by mouth daily, Disp: , Rfl:     Current Allergies     Allergies as of 10/18/2023 - Reviewed 10/18/2023   Allergen Reaction Noted    Cat hair extract Nasal Congestion 08/25/2015    Gluten meal - food allergy  12/04/2018    Mold extract [trichophyton]  12/04/2018    Soy allergy - food allergy GI Intolerance 12/27/2016    Soy [isoflavones (soy)]  12/04/2018            The following portions of the patient's history were reviewed and updated as appropriate: allergies, current medications, past family history, past medical history, past social history, past surgical history and problem list.     Past Medical History:   Diagnosis Date    ADHD (attention deficit hyperactivity disorder)     Anxiety     Depression     Migraine        No past surgical history on file. Family History   Problem Relation Age of Onset    No Known Problems Mother     No Known Problems Father          Medications have been verified. Objective   BP (!) 180/120   Pulse 84   Temp 97.8 °F (36.6 °C)   Resp 18   SpO2 99%   No LMP recorded. Physical Exam     Physical Exam  Vitals and nursing note reviewed. Constitutional:       Appearance: Normal appearance.    HENT:      Mouth/Throat:      Mouth: Mucous membranes are moist.      Pharynx: Posterior oropharyngeal erythema present. Cardiovascular:      Rate and Rhythm: Normal rate and regular rhythm. Heart sounds: Normal heart sounds. Pulmonary:      Breath sounds: Normal breath sounds. No wheezing. Neurological:      General: No focal deficit present. Mental Status: She is alert and oriented to person, place, and time.    Psychiatric:         Mood and Affect: Mood normal.         Behavior: Behavior normal.

## 2023-11-24 ENCOUNTER — OFFICE VISIT (OUTPATIENT)
Dept: URGENT CARE | Facility: CLINIC | Age: 33
End: 2023-11-24
Payer: MEDICARE

## 2023-11-24 VITALS
SYSTOLIC BLOOD PRESSURE: 178 MMHG | WEIGHT: 174 LBS | OXYGEN SATURATION: 99 % | DIASTOLIC BLOOD PRESSURE: 102 MMHG | HEART RATE: 107 BPM | RESPIRATION RATE: 18 BRPM | HEIGHT: 60 IN | TEMPERATURE: 97.6 F | BODY MASS INDEX: 34.16 KG/M2

## 2023-11-24 DIAGNOSIS — H60.333 ACUTE SWIMMER'S EAR OF BOTH SIDES: ICD-10-CM

## 2023-11-24 DIAGNOSIS — J01.00 ACUTE MAXILLARY SINUSITIS, RECURRENCE NOT SPECIFIED: ICD-10-CM

## 2023-11-24 DIAGNOSIS — H66.93 BILATERAL OTITIS MEDIA, UNSPECIFIED OTITIS MEDIA TYPE: Primary | ICD-10-CM

## 2023-11-24 PROCEDURE — 99213 OFFICE O/P EST LOW 20 MIN: CPT | Performed by: PHYSICIAN ASSISTANT

## 2023-11-24 RX ORDER — AMOXICILLIN AND CLAVULANATE POTASSIUM 875; 125 MG/1; MG/1
1 TABLET, FILM COATED ORAL EVERY 12 HOURS SCHEDULED
Qty: 20 TABLET | Refills: 0 | Status: SHIPPED | OUTPATIENT
Start: 2023-11-24 | End: 2023-12-04

## 2023-11-25 NOTE — PROGRESS NOTES
[facility]        NAME: Mazin Schulte is a 29 y.o. female  : 1990    MRN: [id number]  DATE: 2023  TIME: 7:56 PM    BP (!) 178/102   Pulse (!) 107   Temp 97.6 °F (36.4 °C)   Resp 18   Ht 5' (1.524 m)   Wt 78.9 kg (174 lb)   SpO2 99%   BMI 33.98 kg/m²     Assessment and Plan   Bilateral otitis media, unspecified otitis media type [H66.93]  1. Bilateral otitis media, unspecified otitis media type  neomycin-polymyxin-hydrocortisone (CORTISPORIN) otic solution    amoxicillin-clavulanate (AUGMENTIN) 875-125 mg per tablet      2. Acute swimmer's ear of both sides  neomycin-polymyxin-hydrocortisone (CORTISPORIN) otic solution    amoxicillin-clavulanate (AUGMENTIN) 875-125 mg per tablet      3. Acute maxillary sinusitis, recurrence not specified              Patient Instructions       Follow up with PCP in 3-5 days. Proceed to  ER if symptoms worsen. Chief Complaint     Chief Complaint   Patient presents with    Earache     Pt presents with new onset bilateral ear pain that strated around 4pm today following 3 days of productive cough and post nasal drip. No fevers. Pt took advil this past hour. History of Present Illness       Pt with bilat ear pain and pressure  sinus pain and sore throat for several days     Earache         Review of Systems   Review of Systems   Constitutional: Negative. HENT:  Positive for congestion, ear pain, sinus pressure and sinus pain. Eyes: Negative. Respiratory: Negative. Cardiovascular: Negative. Gastrointestinal: Negative. Endocrine: Negative. Genitourinary: Negative. Musculoskeletal: Negative. Skin: Negative. Allergic/Immunologic: Negative. Neurological: Negative. Hematological: Negative. Psychiatric/Behavioral: Negative. All other systems reviewed and are negative.         Current Medications       Current Outpatient Medications:     albuterol (VENTOLIN HFA) 90 mcg/act inhaler, Inhale 2 puffs every 6 (six) hours as needed for wheezing, Disp: 18 g, Rfl: 0    amoxicillin-clavulanate (AUGMENTIN) 875-125 mg per tablet, Take 1 tablet by mouth every 12 (twelve) hours for 10 days, Disp: 20 tablet, Rfl: 0    eletriptan (RELPAX) 40 MG tablet, Take 40 mg by mouth, Disp: , Rfl:     neomycin-polymyxin-hydrocortisone (CORTISPORIN) otic solution, Administer 3 drops into both ears every 6 (six) hours for 10 days, Disp: 6 mL, Rfl: 0    ondansetron (ZOFRAN-ODT) 4 mg disintegrating tablet, TAKE 1 TABLET (4 MG TOTAL) BY MOUTH ONE TIME AS NEEDED FOR NAUSEA OR VOMITING., Disp: , Rfl:     Rimegepant Sulfate (Nurtec) 75 MG TBDP, Take 75 mg by mouth, Disp: , Rfl:     venlafaxine (EFFEXOR-XR) 37.5 mg 24 hr capsule, TAKE 1 CAPSULE BY MOUTH EVERY DAY, Disp: , Rfl:     venlafaxine (EFFEXOR-XR) 75 mg 24 hr capsule, Take 75 mg by mouth daily, Disp: , Rfl:     ALPRAZolam (XANAX) 0.25 mg tablet, Take 0.25 mg by mouth 2 (two) times a day as needed (Patient not taking: Reported on 11/24/2023), Disp: , Rfl:     atoMOXetine (STRATTERA) 60 mg capsule, Take 60 mg by mouth daily (Patient not taking: Reported on 11/24/2023), Disp: , Rfl:     dihydroergotamine (MIGRANAL) 4 MG/ML nasal spray, USE 1 SPRAY INTO EACH NOSTRIL AS NEEDED FOR MIGRAINE. NO MORE THAN 4 SPRAYS IN ONE HOUR. (Patient not taking: Reported on 11/24/2023), Disp: , Rfl:     nortriptyline (PAMELOR) 10 mg capsule, Take by mouth daily at bedtime (Patient not taking: Reported on 12/16/2022), Disp: , Rfl:     predniSONE 20 mg tablet, TAKE 3 TABLETS (60 MG TOTAL) BY MOUTH DAILY.  TAKE UP TO THREE DAYS IN A ROW IF NEEDED FOR MIGRAINE (Patient not taking: Reported on 11/24/2023), Disp: , Rfl:     venlafaxine (EFFEXOR) 75 mg tablet, TAKE 1 TABLET BY MOUTH EVERY DAY (Patient not taking: Reported on 12/16/2022), Disp: , Rfl:     Current Allergies     Allergies as of 11/24/2023 - Reviewed 11/24/2023   Allergen Reaction Noted    Cat hair extract Nasal Congestion 08/25/2015    Gluten meal - food allergy  12/04/2018    Mold extract [trichophyton]  12/04/2018    Soy allergy - food allergy GI Intolerance 12/27/2016    Soy [isoflavones (soy)]  12/04/2018            The following portions of the patient's history were reviewed and updated as appropriate: allergies, current medications, past family history, past medical history, past social history, past surgical history and problem list.     Past Medical History:   Diagnosis Date    ADHD (attention deficit hyperactivity disorder)     Anxiety     Depression     Migraine        History reviewed. No pertinent surgical history. Family History   Problem Relation Age of Onset    No Known Problems Mother     No Known Problems Father          Medications have been verified. Objective   BP (!) 178/102   Pulse (!) 107   Temp 97.6 °F (36.4 °C)   Resp 18   Ht 5' (1.524 m)   Wt 78.9 kg (174 lb)   SpO2 99%   BMI 33.98 kg/m²        Physical Exam     Physical Exam  Vitals and nursing note reviewed. Constitutional:       Appearance: Normal appearance. She is normal weight. HENT:      Head: Normocephalic and atraumatic. Right Ear: External ear normal.      Left Ear: External ear normal.      Ears:      Comments: Tm cherry red erythema bilat    Canals slight erythema no external ear pain no mastoid tenderness      Nose: Congestion and rhinorrhea present. Comments: Boggy mucosa  Max sinus tender to palp  Yellow d/c      Mouth/Throat:      Mouth: Mucous membranes are moist.   Eyes:      Extraocular Movements: Extraocular movements intact. Conjunctiva/sclera: Conjunctivae normal.      Pupils: Pupils are equal, round, and reactive to light. Cardiovascular:      Rate and Rhythm: Normal rate and regular rhythm. Pulses: Normal pulses. Heart sounds: Normal heart sounds. Pulmonary:      Effort: Pulmonary effort is normal.      Breath sounds: Normal breath sounds. Abdominal:      General: Abdomen is flat.  Bowel sounds are normal. Palpations: Abdomen is soft. Musculoskeletal:         General: Normal range of motion. Cervical back: Normal range of motion and neck supple. Skin:     General: Skin is warm. Capillary Refill: Capillary refill takes less than 2 seconds. Neurological:      General: No focal deficit present. Mental Status: She is alert and oriented to person, place, and time.    Psychiatric:         Mood and Affect: Mood normal.

## 2023-11-28 ENCOUNTER — OFFICE VISIT (OUTPATIENT)
Dept: URGENT CARE | Facility: CLINIC | Age: 33
End: 2023-11-28
Payer: MEDICARE

## 2023-11-28 VITALS
HEART RATE: 80 BPM | SYSTOLIC BLOOD PRESSURE: 164 MMHG | RESPIRATION RATE: 20 BRPM | TEMPERATURE: 97.9 F | DIASTOLIC BLOOD PRESSURE: 110 MMHG | OXYGEN SATURATION: 99 %

## 2023-11-28 DIAGNOSIS — H66.93 BILATERAL OTITIS MEDIA, UNSPECIFIED OTITIS MEDIA TYPE: Primary | ICD-10-CM

## 2023-11-28 PROCEDURE — 99213 OFFICE O/P EST LOW 20 MIN: CPT | Performed by: PHYSICIAN ASSISTANT

## 2023-11-28 NOTE — PROGRESS NOTES
[facility]        NAME: Mazin Schulte is a 29 y.o. female  : 1990    MRN: [id number]  DATE: 2023  TIME: 6:17 PM    BP (!) 164/110   Pulse 80   Temp 97.9 °F (36.6 °C)   Resp 20   SpO2 99%     Assessment and Plan   Bilateral otitis media, unspecified otitis media type [H66.93]  1. Bilateral otitis media, unspecified otitis media type  Ambulatory Referral to Otolaryngology            Patient Instructions       Follow up with PCP in 3-5 days. Proceed to  ER if symptoms worsen. Chief Complaint     Chief Complaint   Patient presents with    Earache     Patient has left ear pain. Was seen on  and given abx, however on Friday night she felt a pop in her left ear and started to have discharge. History of Present Illness       Pt returns with left ear bloody d/c and hearing loss 4 days ago after leaving here, pt on augmentin x 4 day     Earache         Review of Systems   Review of Systems   Constitutional: Negative. HENT:  Positive for ear pain. Eyes: Negative. Respiratory: Negative. Cardiovascular: Negative. Gastrointestinal: Negative. Endocrine: Negative. Genitourinary: Negative. Musculoskeletal: Negative. Skin: Negative. Allergic/Immunologic: Negative. Neurological: Negative. Hematological: Negative. Psychiatric/Behavioral: Negative. All other systems reviewed and are negative.         Current Medications       Current Outpatient Medications:     albuterol (VENTOLIN HFA) 90 mcg/act inhaler, Inhale 2 puffs every 6 (six) hours as needed for wheezing, Disp: 18 g, Rfl: 0    ALPRAZolam (XANAX) 0.25 mg tablet, Take 0.25 mg by mouth 2 (two) times a day as needed (Patient not taking: Reported on 2023), Disp: , Rfl:     amoxicillin-clavulanate (AUGMENTIN) 875-125 mg per tablet, Take 1 tablet by mouth every 12 (twelve) hours for 10 days, Disp: 20 tablet, Rfl: 0    atoMOXetine (STRATTERA) 60 mg capsule, Take 60 mg by mouth daily (Patient not taking: Reported on 11/24/2023), Disp: , Rfl:     dihydroergotamine (MIGRANAL) 4 MG/ML nasal spray, USE 1 SPRAY INTO EACH NOSTRIL AS NEEDED FOR MIGRAINE. NO MORE THAN 4 SPRAYS IN ONE HOUR. (Patient not taking: Reported on 11/24/2023), Disp: , Rfl:     eletriptan (RELPAX) 40 MG tablet, Take 40 mg by mouth, Disp: , Rfl:     neomycin-polymyxin-hydrocortisone (CORTISPORIN) otic solution, Administer 3 drops into both ears every 6 (six) hours for 10 days, Disp: 6 mL, Rfl: 0    nortriptyline (PAMELOR) 10 mg capsule, Take by mouth daily at bedtime (Patient not taking: Reported on 12/16/2022), Disp: , Rfl:     ondansetron (ZOFRAN-ODT) 4 mg disintegrating tablet, TAKE 1 TABLET (4 MG TOTAL) BY MOUTH ONE TIME AS NEEDED FOR NAUSEA OR VOMITING., Disp: , Rfl:     predniSONE 20 mg tablet, TAKE 3 TABLETS (60 MG TOTAL) BY MOUTH DAILY.  TAKE UP TO THREE DAYS IN A ROW IF NEEDED FOR MIGRAINE (Patient not taking: Reported on 11/24/2023), Disp: , Rfl:     Rimegepant Sulfate (Nurtec) 75 MG TBDP, Take 75 mg by mouth, Disp: , Rfl:     venlafaxine (EFFEXOR) 75 mg tablet, TAKE 1 TABLET BY MOUTH EVERY DAY (Patient not taking: Reported on 12/16/2022), Disp: , Rfl:     venlafaxine (EFFEXOR-XR) 37.5 mg 24 hr capsule, TAKE 1 CAPSULE BY MOUTH EVERY DAY, Disp: , Rfl:     venlafaxine (EFFEXOR-XR) 75 mg 24 hr capsule, Take 75 mg by mouth daily, Disp: , Rfl:     Current Allergies     Allergies as of 11/28/2023 - Reviewed 11/28/2023   Allergen Reaction Noted    Cat hair extract Nasal Congestion 08/25/2015    Gluten meal - food allergy  12/04/2018    Mold extract [trichophyton]  12/04/2018    Soy allergy - food allergy GI Intolerance 12/27/2016    Soy [isoflavones (soy)]  12/04/2018            The following portions of the patient's history were reviewed and updated as appropriate: allergies, current medications, past family history, past medical history, past social history, past surgical history and problem list.     Past Medical History: Diagnosis Date    ADHD (attention deficit hyperactivity disorder)     Anxiety     Depression     Migraine        History reviewed. No pertinent surgical history. Family History   Problem Relation Age of Onset    No Known Problems Mother     No Known Problems Father          Medications have been verified. Objective   BP (!) 164/110   Pulse 80   Temp 97.9 °F (36.6 °C)   Resp 20   SpO2 99%        Physical Exam     Physical Exam  Vitals and nursing note reviewed. Constitutional:       Appearance: Normal appearance. She is normal weight. Comments: Pt understands need to recheck with ent specialist for follow up for possible perforation , hearing  decrease on left, possible ear mass    HENT:      Head: Normocephalic and atraumatic. Right Ear: Ear canal and external ear normal.      Left Ear: Ear canal and external ear normal.      Ears:      Comments: Right tm cloudy minimal erythema    Left tm erythema no perforation seen  debris in ear canal   No mastoid tenderness bilat        Nose: Nose normal.      Mouth/Throat:      Mouth: Mucous membranes are moist.   Eyes:      Extraocular Movements: Extraocular movements intact. Conjunctiva/sclera: Conjunctivae normal.      Pupils: Pupils are equal, round, and reactive to light. Cardiovascular:      Rate and Rhythm: Normal rate and regular rhythm. Pulses: Normal pulses. Heart sounds: Normal heart sounds. Pulmonary:      Effort: Pulmonary effort is normal.      Breath sounds: Normal breath sounds. Abdominal:      General: Abdomen is flat. Palpations: Abdomen is soft. Musculoskeletal:         General: Normal range of motion. Cervical back: Normal range of motion and neck supple. Skin:     General: Skin is warm. Capillary Refill: Capillary refill takes less than 2 seconds. Neurological:      General: No focal deficit present. Mental Status: She is alert and oriented to person, place, and time.    Psychiatric: Mood and Affect: Mood normal.

## 2025-05-16 ENCOUNTER — OFFICE VISIT (OUTPATIENT)
Dept: URGENT CARE | Facility: CLINIC | Age: 35
End: 2025-05-16

## 2025-05-16 VITALS
TEMPERATURE: 100.1 F | HEART RATE: 89 BPM | OXYGEN SATURATION: 99 % | RESPIRATION RATE: 18 BRPM | WEIGHT: 168.4 LBS | BODY MASS INDEX: 32.89 KG/M2

## 2025-05-16 DIAGNOSIS — J06.9 BACTERIAL URI: Primary | ICD-10-CM

## 2025-05-16 DIAGNOSIS — B96.89 BACTERIAL URI: Primary | ICD-10-CM

## 2025-05-16 PROCEDURE — 99213 OFFICE O/P EST LOW 20 MIN: CPT

## 2025-05-16 RX ORDER — LISINOPRIL 10 MG/1
10 TABLET ORAL EVERY MORNING
COMMUNITY
Start: 2025-02-26

## 2025-05-16 RX ORDER — FLUTICASONE PROPIONATE 50 MCG
1 SPRAY, SUSPENSION (ML) NASAL DAILY
Qty: 9.9 ML | Refills: 0 | Status: SHIPPED | OUTPATIENT
Start: 2025-05-16

## 2025-05-16 NOTE — PROGRESS NOTES
[facility]        NAME: Mitzi Rivera is a 34 y.o. female  : 1990    MRN: [id number]  DATE: May 16, 2025  TIME: 7:54 PM    Assessment and Plan   Bacterial URI [J06.9, B96.89]  1. Bacterial URI  fluticasone (FLONASE) 50 mcg/act nasal spray    amoxicillin-clavulanate (AUGMENTIN) 875-125 mg per tablet            Patient Instructions       Follow up with PCP in 3-5 days.  Proceed to  ER if symptoms worsen.    If tests have been performed at [facility], our office will contact you with results if changes need to be made to the care plan discussed with you at the visit.  [facility]    Chief Complaint     Chief Complaint   Patient presents with    Headache     Started yesterday morning with headache, concerned for increased sinus pressure, concerned for recent viral illness last week, denies fever, tried Excedrin with no relief, reports history of migraines but this pain is different         History of Present Illness       34-year-old female presents for approximately 7 days of cold-like symptoms.  Patient admits symptoms resolved after 3 days but returned and worsened.  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  Denies known fevers.        Review of Systems   Review of Systems   Constitutional:  Positive for fever. Negative for chills.   HENT:  Positive for congestion, ear pain and rhinorrhea. Negative for sore throat.    Respiratory:  Negative for cough.    Neurological:  Positive for headaches.         Current Medications     Current Medications[1]    Current Allergies     Allergies as of 2025 - Reviewed 2025   Allergen Reaction Noted    Cat dander Nasal Congestion 2015    Gluten meal - food allergy  2018    Mold extract [trichophyton]  2018    Soy allergy - food allergy GI Intolerance 2016    Soy [isoflavones (soy)]  2018            The following portions of the patient's history were reviewed  and updated as appropriate: allergies, current medications, past family history, past medical history, past social history, past surgical history and problem list.     Past Medical History:   Diagnosis Date    ADHD (attention deficit hyperactivity disorder)     Anxiety     Depression     Migraine        History reviewed. No pertinent surgical history.    Family History   Problem Relation Age of Onset    No Known Problems Mother     No Known Problems Father          Medications have been verified.        Objective   Pulse 89   Temp 100.1 °F (37.8 °C) (Tympanic)   Resp 18   Wt 76.4 kg (168 lb 6.4 oz)   SpO2 99%   BMI 32.89 kg/m²   No LMP recorded.       Physical Exam     Physical Exam  Vitals and nursing note reviewed.   Constitutional:       General: She is not in acute distress.     Appearance: She is not toxic-appearing.   HENT:      Head: Normocephalic and atraumatic.      Right Ear: Tympanic membrane, ear canal and external ear normal.      Left Ear: Tympanic membrane, ear canal and external ear normal.      Nose: Congestion present.      Mouth/Throat:      Mouth: Mucous membranes are moist.      Pharynx: No oropharyngeal exudate or posterior oropharyngeal erythema.     Eyes:      Conjunctiva/sclera: Conjunctivae normal.       Cardiovascular:      Rate and Rhythm: Normal rate and regular rhythm.   Pulmonary:      Effort: Pulmonary effort is normal.      Breath sounds: Normal breath sounds.   Lymphadenopathy:      Cervical: No cervical adenopathy.     Neurological:      Mental Status: She is alert.      Coordination: Coordination normal.      Gait: Gait normal.     Psychiatric:         Mood and Affect: Mood normal.         Behavior: Behavior normal.                        [1]   Current Outpatient Medications:     amoxicillin-clavulanate (AUGMENTIN) 875-125 mg per tablet, Take 1 tablet by mouth every 12 (twelve) hours for 5 days, Disp: 10 tablet, Rfl: 0    fluticasone (FLONASE) 50 mcg/act nasal spray, 1 spray  into each nostril daily, Disp: 9.9 mL, Rfl: 0    lisinopril (ZESTRIL) 10 mg tablet, Take 10 mg by mouth every morning, Disp: , Rfl:     albuterol (VENTOLIN HFA) 90 mcg/act inhaler, Inhale 2 puffs every 6 (six) hours as needed for wheezing, Disp: 18 g, Rfl: 0    ALPRAZolam (XANAX) 0.25 mg tablet, Take 0.25 mg by mouth 2 (two) times a day as needed (Patient not taking: Reported on 11/24/2023), Disp: , Rfl:     atoMOXetine (STRATTERA) 60 mg capsule, Take 60 mg by mouth daily (Patient not taking: Reported on 11/24/2023), Disp: , Rfl:     dihydroergotamine (MIGRANAL) 4 MG/ML nasal spray, USE 1 SPRAY INTO EACH NOSTRIL AS NEEDED FOR MIGRAINE. NO MORE THAN 4 SPRAYS IN ONE HOUR. (Patient not taking: Reported on 11/24/2023), Disp: , Rfl:     eletriptan (RELPAX) 40 MG tablet, Take 40 mg by mouth, Disp: , Rfl:     neomycin-polymyxin-hydrocortisone (CORTISPORIN) otic solution, Administer 3 drops into both ears every 6 (six) hours for 10 days, Disp: 6 mL, Rfl: 0    nortriptyline (PAMELOR) 10 mg capsule, Take by mouth daily at bedtime (Patient not taking: Reported on 12/16/2022), Disp: , Rfl:     ondansetron (ZOFRAN-ODT) 4 mg disintegrating tablet, TAKE 1 TABLET (4 MG TOTAL) BY MOUTH ONE TIME AS NEEDED FOR NAUSEA OR VOMITING. (Patient not taking: Reported on 5/16/2025), Disp: , Rfl:     predniSONE 20 mg tablet, TAKE 3 TABLETS (60 MG TOTAL) BY MOUTH DAILY. TAKE UP TO THREE DAYS IN A ROW IF NEEDED FOR MIGRAINE (Patient not taking: Reported on 11/24/2023), Disp: , Rfl:     Rimegepant Sulfate (Nurtec) 75 MG TBDP, Take 75 mg by mouth (Patient not taking: Reported on 5/16/2025), Disp: , Rfl:     venlafaxine (EFFEXOR) 75 mg tablet, TAKE 1 TABLET BY MOUTH EVERY DAY (Patient not taking: Reported on 12/16/2022), Disp: , Rfl:     venlafaxine (EFFEXOR-XR) 37.5 mg 24 hr capsule, TAKE 1 CAPSULE BY MOUTH EVERY DAY, Disp: , Rfl:     venlafaxine (EFFEXOR-XR) 75 mg 24 hr capsule, Take 75 mg by mouth daily, Disp: , Rfl: